# Patient Record
Sex: MALE | URBAN - METROPOLITAN AREA
[De-identification: names, ages, dates, MRNs, and addresses within clinical notes are randomized per-mention and may not be internally consistent; named-entity substitution may affect disease eponyms.]

---

## 2020-07-25 ENCOUNTER — TELEPHONE ENCOUNTER (OUTPATIENT)
Dept: URBAN - METROPOLITAN AREA CLINIC 13 | Facility: CLINIC | Age: 35
End: 2020-07-25

## 2020-07-26 ENCOUNTER — TELEPHONE ENCOUNTER (OUTPATIENT)
Dept: URBAN - METROPOLITAN AREA CLINIC 13 | Facility: CLINIC | Age: 35
End: 2020-07-26

## 2020-07-26 RX ORDER — ADALIMUMAB 40MG/0.4ML
KIT SUBCUTANEOUS
Qty: 12 | Refills: 0 | Status: ACTIVE | COMMUNITY
Start: 2019-07-31

## 2020-07-26 RX ORDER — AZATHIOPRINE 50 1/1
TABLET ORAL
Qty: 180 | Refills: 0 | Status: ACTIVE | COMMUNITY
Start: 2017-12-08

## 2020-07-26 RX ORDER — DICLOFENAC SODIUM 10 MG/G
APP 2 GRAMS AA QID GEL TOPICAL
Qty: 300 | Refills: 0 | Status: ACTIVE | COMMUNITY
Start: 2019-09-12

## 2020-07-26 RX ORDER — ADALIMUMAB 40MG/0.8ML
KIT SUBCUTANEOUS
Qty: 12 | Refills: 0 | Status: ACTIVE | COMMUNITY
Start: 2018-11-14

## 2022-04-01 ENCOUNTER — LAB OUTSIDE AN ENCOUNTER (OUTPATIENT)
Dept: URBAN - METROPOLITAN AREA CLINIC 113 | Facility: CLINIC | Age: 37
End: 2022-04-01

## 2022-04-01 ENCOUNTER — WEB ENCOUNTER (OUTPATIENT)
Dept: URBAN - METROPOLITAN AREA CLINIC 113 | Facility: CLINIC | Age: 37
End: 2022-04-01

## 2022-04-01 ENCOUNTER — OFFICE VISIT (OUTPATIENT)
Dept: URBAN - METROPOLITAN AREA CLINIC 113 | Facility: CLINIC | Age: 37
End: 2022-04-01
Payer: COMMERCIAL

## 2022-04-01 VITALS
SYSTOLIC BLOOD PRESSURE: 147 MMHG | RESPIRATION RATE: 20 BRPM | TEMPERATURE: 98.6 F | WEIGHT: 255 LBS | HEART RATE: 84 BPM | DIASTOLIC BLOOD PRESSURE: 95 MMHG | BODY MASS INDEX: 38.65 KG/M2 | HEIGHT: 68 IN

## 2022-04-01 DIAGNOSIS — K50.80 CROHN'S DISEASE OF BOTH SMALL AND LARGE INTESTINE WITHOUT COMPLICATION: ICD-10-CM

## 2022-04-01 PROCEDURE — 99204 OFFICE O/P NEW MOD 45 MIN: CPT | Performed by: INTERNAL MEDICINE

## 2022-04-01 RX ORDER — AZATHIOPRINE 50 1/1
TABLET ORAL
Qty: 180 | Refills: 0 | Status: ACTIVE | COMMUNITY
Start: 2017-12-08

## 2022-04-01 RX ORDER — ADALIMUMAB 40MG/0.4ML
KIT SUBCUTANEOUS
Qty: 12 | Refills: 0 | Status: ON HOLD | COMMUNITY
Start: 2019-07-31

## 2022-04-01 RX ORDER — DICLOFENAC SODIUM 10 MG/G
APP 2 GRAMS AA QID GEL TOPICAL
Qty: 300 | Refills: 0 | Status: ACTIVE | COMMUNITY
Start: 2019-09-12

## 2022-04-01 RX ORDER — ADALIMUMAB 40MG/0.4ML
AS DIRECTED KIT SUBCUTANEOUS
Refills: 0 | Status: ACTIVE | COMMUNITY
Start: 2018-11-14

## 2022-04-01 NOTE — HPI-TODAY'S VISIT:
Eyad Hernandes is a 36 year old male who presents as a new patient for evaluation of Crohn's disease.  He was diagnosed with ileocolonic Crohn's disease in September 2014 by Dr. Sifuentes in [place]. He waas initially treated with budesonide and Pentasa before being switched to Remicade. He did well on Remicade for ~ 9 months before flaring. His flares have been associated with long segment ileal stenosis, along with ileocolonic and colocolonic fistulization.  CRP levels at the time of his flare were 8.8; ESR was 16. He has never had a colocutaneous fistula or a fistula to any other internal organ, athough he did have an ileal abscess at one point (initial diagnosis).   He received a second opinion from Dr. Rebecca De Dios at [facility] in 2016.  He was found to have high-level antibody formation and low drug levels and was switched to Humira and azathioprine on 5/17/16.  Pretreatment TMPT was normal.  He responded well to azathioptine and Humira and was in complete endoscopic remission when he underwent staging colonoscopy in January 2017.   Notably, he has been vaccinated against Hep B (immune since 2017) and Hep A (vaccine given 9/2017) as well as pneumovax (6/2016).    He was lost to GI follow up from 12/2017-7/2019. When he returned for follow-up, he had repeat MRE testing showing stable fisutlizing disease in and around the TI and cecum. QuantiFERON gold performed in 12/2019 was negative.  He had 6 MMP levels and 6-TG levels done which showed low 6-TG levels, prompting an increase in azathioprine first to 150 mg and then to 200 mg/day.  He has been maintained on Humira 40 mg weekly.  He had a CT enterography done in April 2021 showing persistent and stable ileal stenosis, with colocolonic and ileocolonic fistulization.  His last visit at [facility] was in 9/2021.   He has been maintained on azathioprine 200 mg daily and Humira 40 mg weekly, and this regimen helps maintain his remission.  He is having 1 formed stool per day.  Typically, in the spring, he will have 1 or 2 days a year when the pollen is severe when he has no energy, abdominal pain and loose stools, and cannot eat.  Usually, he has a liquid diet for a couple of days and the symptoms resolved.  As he has now moved to [place], he has elected to follow-up here as opposed to [place].  He feels that he is doing well overall, and in fact has gained weight recently during the pandemic.  Notably, he does have a prior history of vitamin D deficiency.  His vitamin B12 levels most recently were normal.

## 2022-04-13 LAB
6-MMPN: 576
6-TGN: 309
A/G RATIO: 1.3
ALBUMIN: 4.4
ALKALINE PHOSPHATASE: 71
ALT (SGPT): 31
AST (SGOT): 22
BASO (ABSOLUTE): 0.1
BASOS: 1
BILIRUBIN, TOTAL: 0.7
BUN/CREATININE RATIO: 11
BUN: 12
C-REACTIVE PROTEIN, QUANT: 3
CALCIUM: 9
CARBON DIOXIDE, TOTAL: 19
CHLORIDE: 98
CREATININE: 1.12
EGFR: 88
EOS (ABSOLUTE): 0.1
EOS: 2
GLOBULIN, TOTAL: 3.3
GLUCOSE: 93
HEMATOCRIT: 47.2
HEMATOLOGY COMMENTS:: (no result)
HEMOGLOBIN: 16.8
IMMATURE CELLS: (no result)
IMMATURE GRANS (ABS): 0
IMMATURE GRANULOCYTES: 0
LYMPHS (ABSOLUTE): 1.1
LYMPHS: 24
MCH: 33.3
MCHC: 35.6
MCV: 94
MONOCYTES(ABSOLUTE): 0.4
MONOCYTES: 10
NEUTROPHILS (ABSOLUTE): 2.7
NEUTROPHILS: 63
NRBC: (no result)
PLATELETS: 263
POTASSIUM: 4
PROTEIN, TOTAL: 7.7
RBC: 5.05
RDW: 15
SEDIMENTATION RATE-WESTERGREN: 10
SODIUM: 136
VITAMIN B12: 519
VITAMIN D, 25-HYDROXY: 39
WBC: 4.4

## 2022-05-27 ENCOUNTER — OFFICE VISIT (OUTPATIENT)
Dept: URBAN - METROPOLITAN AREA SURGERY CENTER 25 | Facility: SURGERY CENTER | Age: 37
End: 2022-05-27
Payer: COMMERCIAL

## 2022-05-27 ENCOUNTER — CLAIMS CREATED FROM THE CLAIM WINDOW (OUTPATIENT)
Dept: URBAN - METROPOLITAN AREA CLINIC 4 | Facility: CLINIC | Age: 37
End: 2022-05-27
Payer: COMMERCIAL

## 2022-05-27 DIAGNOSIS — K50.813 CROHN'S DISEASE OF BOTH SMALL AND LARGE INTESTINE W FISTULA: ICD-10-CM

## 2022-05-27 DIAGNOSIS — K63.89 CYST OF DUODENUM: ICD-10-CM

## 2022-05-27 PROCEDURE — 45380 COLONOSCOPY AND BIOPSY: CPT | Performed by: INTERNAL MEDICINE

## 2022-05-27 PROCEDURE — 88305 TISSUE EXAM BY PATHOLOGIST: CPT | Performed by: PATHOLOGY

## 2022-05-27 PROCEDURE — G8907 PT DOC NO EVENTS ON DISCHARG: HCPCS | Performed by: INTERNAL MEDICINE

## 2022-05-27 RX ORDER — DICLOFENAC SODIUM 10 MG/G
APP 2 GRAMS AA QID GEL TOPICAL
Qty: 300 | Refills: 0 | Status: ACTIVE | COMMUNITY
Start: 2019-09-12

## 2022-05-27 RX ORDER — ADALIMUMAB 40MG/0.4ML
AS DIRECTED KIT SUBCUTANEOUS
Refills: 0 | Status: ACTIVE | COMMUNITY
Start: 2018-11-14

## 2022-05-27 RX ORDER — AZATHIOPRINE 50 1/1
TABLET ORAL
Qty: 180 | Refills: 0 | Status: ACTIVE | COMMUNITY
Start: 2017-12-08

## 2022-07-24 ENCOUNTER — WEB ENCOUNTER (OUTPATIENT)
Dept: URBAN - METROPOLITAN AREA CLINIC 113 | Facility: CLINIC | Age: 37
End: 2022-07-24

## 2022-07-24 RX ORDER — ADALIMUMAB 40MG/0.4ML
AS DIRECTED KIT SUBCUTANEOUS
Qty: 4 | Refills: 11
Start: 2018-11-14 | End: 2023-06-26

## 2022-08-01 ENCOUNTER — WEB ENCOUNTER (OUTPATIENT)
Dept: URBAN - METROPOLITAN AREA CLINIC 113 | Facility: CLINIC | Age: 37
End: 2022-08-01

## 2022-08-29 ENCOUNTER — WEB ENCOUNTER (OUTPATIENT)
Dept: URBAN - METROPOLITAN AREA CLINIC 113 | Facility: CLINIC | Age: 37
End: 2022-08-29

## 2022-08-29 ENCOUNTER — OFFICE VISIT (OUTPATIENT)
Dept: URBAN - METROPOLITAN AREA CLINIC 113 | Facility: CLINIC | Age: 37
End: 2022-08-29
Payer: COMMERCIAL

## 2022-08-29 VITALS
RESPIRATION RATE: 16 BRPM | TEMPERATURE: 98.6 F | SYSTOLIC BLOOD PRESSURE: 144 MMHG | BODY MASS INDEX: 38.95 KG/M2 | WEIGHT: 257 LBS | HEIGHT: 68 IN | HEART RATE: 67 BPM | DIASTOLIC BLOOD PRESSURE: 91 MMHG

## 2022-08-29 DIAGNOSIS — K50.80 CROHN'S DISEASE OF BOTH SMALL AND LARGE INTESTINE WITHOUT COMPLICATION: ICD-10-CM

## 2022-08-29 PROCEDURE — 99214 OFFICE O/P EST MOD 30 MIN: CPT | Performed by: INTERNAL MEDICINE

## 2022-08-29 RX ORDER — ADALIMUMAB 40MG/0.4ML
AS DIRECTED KIT SUBCUTANEOUS
Qty: 4 | Refills: 11 | Status: ACTIVE | COMMUNITY
Start: 2018-11-14 | End: 2023-06-26

## 2022-08-29 RX ORDER — DICLOFENAC SODIUM 10 MG/G
APP 2 GRAMS AA QID GEL TOPICAL
Qty: 300 | Refills: 0 | Status: ACTIVE | COMMUNITY
Start: 2019-09-12

## 2022-08-29 RX ORDER — AZATHIOPRINE 50 1/1
TABLET ORAL
Qty: 180 | Refills: 0 | Status: ACTIVE | COMMUNITY
Start: 2017-12-08

## 2022-08-29 NOTE — HPI-TODAY'S VISIT:
Eyad Hernandes is a 37 year old male who presents as for follow up of Crohn's disease.  He was last seen here on 4/1/22.  Mr. Hernandes was diagnosed with ileocolonic Crohn's disease in September 2014 by Dr. Sifuentes in [place]. He waas initially treated with budesonide and Pentasa before being switched to Remicade. He did well on Remicade for ~ 9 months before flaring. His flares have been associated with long segment ileal stenosis, along with ileocolonic and colocolonic fistulization.  CRP levels at the time of his flare were 8.8; ESR was 16. He has never had a colocutaneous fistula or a fistula to any other internal organ, athough he did have an ileal abscess at one point (initial diagnosis).   He received a second opinion from Dr. Rebecca De Dios [place] [facility] [place].  He was found to have high-level antibody formation and low drug levels and was switched to Humira and azathioprine on 5/17/16.  Pretreatment TMPT was normal.  He responded well to azathioptine and Humira and was in complete endoscopic remission when he underwent staging colonoscopy in January 2017.   Notably, he has been vaccinated against Hep B (immune since 2017) and Hep A (vaccine given 9/2017) as well as pneumovax (6/2016).    He was lost to GI follow up from 12/2017-7/2019. When he returned for follow-up, he had repeat MRE testing showing stable fistulizing disease in and around the TI and cecum. QuantiFERON gold performed in 12/2019 was negative.  He had 6 MMP levels and 6-TG levels done which showed low 6-TG levels, prompting an increase in azathioprine first to 150 mg and then to 200 mg/day.  He has been maintained on Humira 40 mg weekly.  He had a CT enterography done in April 2021 showing persistent and stable ileal stenosis, with colocolonic and ileocolonic fistulization.  His last visit at [facility] was in 9/2021.   He has been maintained on azathioprine 200 mg daily and Humira 40 mg weekly, and this regimen helps maintain his remission.  He is having 1 formed stool per day.  Typically, in the spring, he will have 1 or 2 days a year when the pollen is severe when he has no energy, abdominal pain and loose stools, and cannot eat.  Usually, he has a liquid diet for a couple of days and the symptoms resolved.  As he recently moved to [place], and then to the [facility], he elected to follow-up here as opposed to [place].  He feels that he is doing well overall, and in fact has gained weight recently during the pandemic.  Notably, he does have a prior history of vitamin D deficiency.  His vitamin B12 levels most recently were normal.  After his initial office visit, he underwent colonoscopy on 5/27/2022.  This showed some hepatic flexure pseudopolyps, fistula at the hepatic flexure, but a normal terminal ileum, left colon diverticulosis but was otherwise negative.  Random colon biopsies showed no evidence of dysplasia nor active Crohn's disease.  The patient continues to do well.  He would like to see if he can taper some of his medications.  Labs were ordered at his last visit but I have no record of them.

## 2022-10-14 ENCOUNTER — WEB ENCOUNTER (OUTPATIENT)
Dept: URBAN - METROPOLITAN AREA CLINIC 113 | Facility: CLINIC | Age: 37
End: 2022-10-14

## 2022-10-14 RX ORDER — ADALIMUMAB 40MG/0.4ML
AS DIRECTED KIT SUBCUTANEOUS
Qty: 4 | Refills: 11
Start: 2018-11-14 | End: 2023-09-15

## 2022-10-19 LAB
6 MMP: <500
6 TG: 127
A/G RATIO: 1.2
ABSOLUTE BASOPHILS: 60
ABSOLUTE EOSINOPHILS: 130
ABSOLUTE LYMPHOCYTES: 790
ABSOLUTE MONOCYTES: 380
ABSOLUTE NEUTROPHILS: 3640
ALBUMIN: 4.1
ALKALINE PHOSPHATASE: 70
ALT (SGPT): 27
AST (SGOT): 19
BASOPHILS: 1.2
BILIRUBIN, TOTAL: 0.6
BUN/CREATININE RATIO: (no result)
BUN: 14
C-REACTIVE PROTEIN, QUANT: 6.7
CALCIUM: 9
CARBON DIOXIDE, TOTAL: 26
CHLORIDE: 105
CREATININE: 1.11
EGFR: 89
EOSINOPHILS: 2.6
GLOBULIN, TOTAL: 3.3
GLUCOSE: 99
HEMATOCRIT: 48.3
HEMOGLOBIN: 16.7
LYMPHOCYTES: 15.8
MCH: 31.5
MCHC: 34.6
MCV: 91.1
MONOCYTES: 7.6
MPV: 9.8
NEUTROPHILS: 72.8
PLATELET COUNT: 238
POTASSIUM: 4.3
PROTEIN, TOTAL: 7.4
RDW: 12.9
RED BLOOD CELL COUNT: 5.3
SED RATE BY MODIFIED: 2
SODIUM: 141
VITAMIN B12: 364
VITAMIN D,25-OH,TOTAL,IA: 45
WHITE BLOOD CELL COUNT: 5

## 2022-10-21 ENCOUNTER — WEB ENCOUNTER (OUTPATIENT)
Dept: URBAN - METROPOLITAN AREA CLINIC 113 | Facility: CLINIC | Age: 37
End: 2022-10-21

## 2022-10-21 ENCOUNTER — TELEPHONE ENCOUNTER (OUTPATIENT)
Dept: URBAN - METROPOLITAN AREA CLINIC 113 | Facility: CLINIC | Age: 37
End: 2022-10-21

## 2022-10-21 RX ORDER — ADALIMUMAB 40MG/0.4ML
AS DIRECTED KIT SUBCUTANEOUS
Qty: 4 | Refills: 11
Start: 2018-11-14 | End: 2023-09-22

## 2022-10-21 RX ORDER — ADALIMUMAB 40MG/0.8ML
0.8 ML KIT SUBCUTANEOUS
Qty: 2 | Refills: 11 | OUTPATIENT
Start: 2022-10-21 | End: 2023-10-16

## 2022-10-27 ENCOUNTER — WEB ENCOUNTER (OUTPATIENT)
Dept: URBAN - METROPOLITAN AREA CLINIC 113 | Facility: CLINIC | Age: 37
End: 2022-10-27

## 2022-10-28 ENCOUNTER — WEB ENCOUNTER (OUTPATIENT)
Dept: URBAN - METROPOLITAN AREA CLINIC 113 | Facility: CLINIC | Age: 37
End: 2022-10-28

## 2022-11-17 ENCOUNTER — OFFICE VISIT (OUTPATIENT)
Dept: URBAN - METROPOLITAN AREA CLINIC 113 | Facility: CLINIC | Age: 37
End: 2022-11-17

## 2023-02-02 ENCOUNTER — OFFICE VISIT (OUTPATIENT)
Dept: URBAN - METROPOLITAN AREA CLINIC 113 | Facility: CLINIC | Age: 38
End: 2023-02-02
Payer: COMMERCIAL

## 2023-02-02 ENCOUNTER — DASHBOARD ENCOUNTERS (OUTPATIENT)
Age: 38
End: 2023-02-02

## 2023-02-02 VITALS
WEIGHT: 246.4 LBS | HEART RATE: 62 BPM | RESPIRATION RATE: 18 BRPM | TEMPERATURE: 97.1 F | HEIGHT: 68 IN | DIASTOLIC BLOOD PRESSURE: 94 MMHG | SYSTOLIC BLOOD PRESSURE: 133 MMHG | BODY MASS INDEX: 37.35 KG/M2

## 2023-02-02 DIAGNOSIS — K50.80 CROHN'S DISEASE OF BOTH SMALL AND LARGE INTESTINE WITHOUT COMPLICATION: ICD-10-CM

## 2023-02-02 PROCEDURE — 99213 OFFICE O/P EST LOW 20 MIN: CPT | Performed by: INTERNAL MEDICINE

## 2023-02-02 RX ORDER — DICLOFENAC SODIUM 10 MG/G
APP 2 GRAMS AA QID GEL TOPICAL
Qty: 300 | Refills: 0 | Status: ACTIVE | COMMUNITY
Start: 2019-09-12

## 2023-02-02 RX ORDER — AZATHIOPRINE 50 1/1
AS DIRECTED TABLET ORAL
Refills: 0 | Status: ACTIVE | COMMUNITY
Start: 2017-12-08

## 2023-02-02 RX ORDER — AZATHIOPRINE 100 MG/1
AS DIRECTED TABLET ORAL
Status: ACTIVE | COMMUNITY

## 2023-02-02 RX ORDER — ADALIMUMAB 40MG/0.4ML
AS DIRECTED KIT SUBCUTANEOUS
Qty: 4 | Refills: 11 | Status: ACTIVE | COMMUNITY
Start: 2018-11-14 | End: 2023-09-22

## 2023-02-02 RX ORDER — HYOSCYAMINE SULFATE 0.12 MG/1
1 TABLET UNDER THE TONGUE AND ALLOW TO DISSOLVE  AS NEEDED TABLET SUBLINGUAL THREE TIMES A DAY
Qty: 60 | Refills: 3 | OUTPATIENT
Start: 2023-02-02 | End: 2023-06-02

## 2023-02-02 RX ORDER — ADALIMUMAB 40MG/0.8ML
0.8 ML KIT SUBCUTANEOUS
Qty: 2 | Refills: 11 | Status: ACTIVE | COMMUNITY
Start: 2022-10-21 | End: 2023-10-16

## 2023-02-02 NOTE — HPI-TODAY'S VISIT:
Eyad Hernandes is a 37 year old male who presents as for follow up of Crohn's disease.  He was last seen here on 4/1/22.  Mr. Hernandes was diagnosed with ileocolonic Crohn's disease in September 2014 by Dr. Sifuentes in [place]. He waas initially treated with budesonide and Pentasa before being switched to Remicade. He did well on Remicade for ~ 9 months before flaring. His flares have been associated with long segment ileal stenosis, along with ileocolonic and colocolonic fistulization.  CRP levels at the time of his flare were 8.8; ESR was 16. He has never had a colocutaneous fistula or a fistula to any other internal organ, athough he did have an ileal abscess at one point (initial diagnosis).   He received a second opinion from Dr. Rebecca De Dios [place] [facility] [place].  He was found to have high-level antibody formation and low drug levels and was switched to Humira and azathioprine on 5/17/16.  Pretreatment TMPT was normal.  He responded well to azathioptine and Humira and was in complete endoscopic remission when he underwent staging colonoscopy in January 2017.   Notably, he has been vaccinated against Hep B (immune since 2017) and Hep A (vaccine given 9/2017) as well as pneumovax (6/2016).    He was lost to GI follow up from 12/2017-7/2019. When he returned for follow-up, he had repeat MRE testing showing stable fistulizing disease in and around the TI and cecum. QuantiFERON gold performed in 12/2019 was negative.  He had 6 MMP levels and 6-TG levels done which showed low 6-TG levels, prompting an increase in azathioprine first to 150 mg and then to 200 mg/day.  He has been maintained on Humira 40 mg weekly.  He had a CT enterography done in April 2021 showing persistent and stable ileal stenosis, with colocolonic and ileocolonic fistulization.  His last visit at [facility] was in 9/2021.   He has been maintained on azathioprine 200 mg daily and Humira 40 mg weekly, and this regimen helps maintain his remission.  He is having 1 formed stool per day.  Typically, in the spring, he will have 1 or 2 days a year when the pollen is severe when he has no energy, abdominal pain and loose stools, and cannot eat.  Usually, he has a liquid diet for a couple of days and the symptoms resolved.  As he recently moved to [place], and then to the [facility], he elected to follow-up here as opposed to [place].  He feels that he is doing well overall, and in fact has gained weight recently during the pandemic.  Notably, he does have a prior history of vitamin D deficiency.  His vitamin B12 levels most recently were normal.  After his initial office visit, he underwent colonoscopy on 5/27/2022.  This showed some hepatic flexure pseudopolyps, fistula at the hepatic flexure, but a normal terminal ileum, left colon diverticulosis but was otherwise negative.  Random colon biopsies showed no evidence of dysplasia nor active Crohn's disease.  The patient continues to do well.  He would like to see if he can taper some of his medications.  Labs were ordered at his last visit.  These included a white blood cell count of 5000, hemoglobin 16.7, platelet count 238,000, normal complete metabolic panel, vitamin D level of 45, C-reactive protein is 6.7, sedimentation rate of 2, and a vitamin B12 of 364.  His 6-TG level was somewhat low at 127, lower limit of normal being 235.  The patient is currently taking azathioprine 150 mg daily and weekly Humira.  Yesterday, he began having crampy lower abdominal pain and nausea with no vomiting.  He denies any diarrhea.  His wife thinks he had a subjective fever although she did not take his temperature.  He typically will have a flare of his symptoms in the spring.

## 2023-02-03 ENCOUNTER — TELEPHONE ENCOUNTER (OUTPATIENT)
Dept: URBAN - METROPOLITAN AREA CLINIC 113 | Facility: CLINIC | Age: 38
End: 2023-02-03

## 2023-02-03 PROBLEM — 71833008: Status: ACTIVE | Noted: 2022-04-01

## 2023-02-03 LAB
A/G RATIO: 1.4
ABSOLUTE BASOPHILS: 50
ABSOLUTE EOSINOPHILS: 33
ABSOLUTE LYMPHOCYTES: 772
ABSOLUTE MONOCYTES: 722
ABSOLUTE NEUTROPHILS: 6723
ALBUMIN: 4.5
ALKALINE PHOSPHATASE: 75
ALT (SGPT): 18
AST (SGOT): 16
BASOPHILS: 0.6
BILIRUBIN, TOTAL: 0.8
BUN/CREATININE RATIO: (no result)
BUN: 14
C-REACTIVE PROTEIN, QUANT: 33.9
CALCIUM: 9.3
CARBON DIOXIDE, TOTAL: 27
CHLORIDE: 102
CREATININE: 1.15
EGFR: 85
EOSINOPHILS: 0.4
GLOBULIN, TOTAL: 3.2
GLUCOSE: 114
HEMATOCRIT: 49.1
HEMOGLOBIN: 17.2
LYMPHOCYTES: 9.3
MCH: 31.3
MCHC: 35
MCV: 89.4
MONOCYTES: 8.7
MPV: 9.9
NEUTROPHILS: 81
PLATELET COUNT: 303
POTASSIUM: 4.3
PROTEIN, TOTAL: 7.7
RDW: 13.8
RED BLOOD CELL COUNT: 5.49
SED RATE BY MODIFIED: 9
SODIUM: 139
WHITE BLOOD CELL COUNT: 8.3

## 2023-02-03 RX ORDER — CIPROFLOXACIN 500 MG/1
1 TABLET TABLET, FILM COATED ORAL
Qty: 20 | OUTPATIENT
Start: 2023-02-07 | End: 2023-02-17

## 2023-12-19 ENCOUNTER — WEB ENCOUNTER (OUTPATIENT)
Dept: URBAN - METROPOLITAN AREA CLINIC 113 | Facility: CLINIC | Age: 38
End: 2023-12-19

## 2023-12-19 ENCOUNTER — ERX REFILL RESPONSE (OUTPATIENT)
Dept: URBAN - METROPOLITAN AREA CLINIC 113 | Facility: CLINIC | Age: 38
End: 2023-12-19

## 2023-12-19 RX ORDER — ADALIMUMAB 40MG/0.4ML
AS DIRECTED KIT SUBCUTANEOUS
Qty: 4 | Refills: 11
Start: 2018-11-14 | End: 2024-11-19

## 2023-12-19 RX ORDER — ADALIMUMAB 40MG/0.4ML
INJECT 40MG SUBCUTANEOUSLY  WEEKLY KIT SUBCUTANEOUS
Qty: 4 EACH | Refills: 0 | OUTPATIENT

## 2024-11-26 ENCOUNTER — WEB ENCOUNTER (OUTPATIENT)
Dept: URBAN - METROPOLITAN AREA CLINIC 113 | Facility: CLINIC | Age: 39
End: 2024-11-26

## 2025-01-22 ENCOUNTER — TELEPHONE ENCOUNTER (OUTPATIENT)
Dept: URBAN - METROPOLITAN AREA CLINIC 113 | Facility: CLINIC | Age: 40
End: 2025-01-22

## 2025-01-30 ENCOUNTER — WEB ENCOUNTER (OUTPATIENT)
Dept: URBAN - METROPOLITAN AREA CLINIC 113 | Facility: CLINIC | Age: 40
End: 2025-01-30

## 2025-01-30 RX ORDER — ADALIMUMAB-ATTO 40 MG/.8ML
AS DIRECTED INJECTION SUBCUTANEOUS WEEKLY
Refills: 3
Start: 2024-12-02 | End: 2026-01-25

## 2025-02-03 ENCOUNTER — TELEPHONE ENCOUNTER (OUTPATIENT)
Dept: URBAN - METROPOLITAN AREA CLINIC 113 | Facility: CLINIC | Age: 40
End: 2025-02-03

## 2025-02-04 ENCOUNTER — WEB ENCOUNTER (OUTPATIENT)
Dept: URBAN - METROPOLITAN AREA CLINIC 113 | Facility: CLINIC | Age: 40
End: 2025-02-04

## 2025-02-05 ENCOUNTER — TELEPHONE ENCOUNTER (OUTPATIENT)
Dept: URBAN - METROPOLITAN AREA CLINIC 113 | Facility: CLINIC | Age: 40
End: 2025-02-05

## 2025-02-05 ENCOUNTER — WEB ENCOUNTER (OUTPATIENT)
Dept: URBAN - METROPOLITAN AREA CLINIC 113 | Facility: CLINIC | Age: 40
End: 2025-02-05

## 2025-02-11 ENCOUNTER — TELEPHONE ENCOUNTER (OUTPATIENT)
Dept: URBAN - METROPOLITAN AREA CLINIC 113 | Facility: CLINIC | Age: 40
End: 2025-02-11

## 2025-02-11 ENCOUNTER — TELEPHONE ENCOUNTER (OUTPATIENT)
Dept: URBAN - METROPOLITAN AREA CLINIC 107 | Facility: CLINIC | Age: 40
End: 2025-02-11

## 2025-02-11 ENCOUNTER — WEB ENCOUNTER (OUTPATIENT)
Dept: URBAN - METROPOLITAN AREA CLINIC 113 | Facility: CLINIC | Age: 40
End: 2025-02-11

## 2025-02-11 ENCOUNTER — OFFICE VISIT (OUTPATIENT)
Dept: URBAN - METROPOLITAN AREA CLINIC 107 | Facility: CLINIC | Age: 40
End: 2025-02-11
Payer: COMMERCIAL

## 2025-02-11 ENCOUNTER — LAB OUTSIDE AN ENCOUNTER (OUTPATIENT)
Dept: URBAN - METROPOLITAN AREA CLINIC 107 | Facility: CLINIC | Age: 40
End: 2025-02-11

## 2025-02-11 ENCOUNTER — TELEPHONE ENCOUNTER (OUTPATIENT)
Dept: URBAN - METROPOLITAN AREA CLINIC 112 | Facility: CLINIC | Age: 40
End: 2025-02-11

## 2025-02-11 VITALS
DIASTOLIC BLOOD PRESSURE: 90 MMHG | SYSTOLIC BLOOD PRESSURE: 140 MMHG | HEIGHT: 68 IN | HEART RATE: 71 BPM | TEMPERATURE: 97.9 F | BODY MASS INDEX: 37.74 KG/M2 | WEIGHT: 249 LBS

## 2025-02-11 DIAGNOSIS — Z12.11 SCREENING FOR COLON CANCER: ICD-10-CM

## 2025-02-11 DIAGNOSIS — Z79.899 HIGH RISK MEDICATION USE: ICD-10-CM

## 2025-02-11 DIAGNOSIS — K50.80 CROHN'S DISEASE OF BOTH SMALL AND LARGE INTESTINE WITHOUT COMPLICATION: ICD-10-CM

## 2025-02-11 PROCEDURE — 99214 OFFICE O/P EST MOD 30 MIN: CPT | Performed by: NURSE PRACTITIONER

## 2025-02-11 RX ORDER — ADALIMUMAB-ATTO 40 MG/.8ML
AS DIRECTED INJECTION SUBCUTANEOUS WEEKLY
Refills: 3 | Status: ON HOLD | COMMUNITY
Start: 2024-12-02 | End: 2026-01-25

## 2025-02-11 RX ORDER — AZATHIOPRINE 100 MG/1
AS DIRECTED TABLET ORAL
Status: ACTIVE | COMMUNITY

## 2025-02-11 RX ORDER — ADALIMUMAB 40MG/0.4ML
INJECT 40MG SUBCUTANEOUSLY  WEEKLY KIT SUBCUTANEOUS
Qty: 4 EACH | Refills: 0

## 2025-02-11 RX ORDER — DICLOFENAC SODIUM TOPICAL GEL, 1% 10 MG/G
APP 2 GRAMS AA QID GEL TOPICAL
Qty: 300 | Refills: 0 | Status: ON HOLD | COMMUNITY
Start: 2019-09-12

## 2025-02-11 RX ORDER — AZATHIOPRINE 100 MG/1
AS DIRECTED TABLET ORAL ONCE A DAY
Qty: 90 | Refills: 3 | Status: ACTIVE | COMMUNITY
Start: 2017-12-08 | End: 2025-03-16

## 2025-02-11 RX ORDER — ADALIMUMAB 40MG/0.4ML
INJECT 40MG SUBCUTANEOUSLY  WEEKLY KIT SUBCUTANEOUS
Qty: 4 EACH | Refills: 0 | Status: ON HOLD | COMMUNITY

## 2025-02-11 RX ORDER — ADALIMUMAB-ATTO 40 MG/.8ML
AS DIRECTED INJECTION SUBCUTANEOUS WEEKLY
Refills: 3
Start: 2024-12-02 | End: 2026-02-08

## 2025-02-11 NOTE — HPI-TODAY'S VISIT:
Eyad Hernandes is a 39-year-old male who presents as for follow up of Crohn's disease.   Last seen 2/2/2023 for Crohn's multiple small and large intestine. Was started on hyoscyamine as needed for pain. Maintained in clinical remission on azathioprine 150 mg daily and Humira 40 mg subcu weekly. Labs and MR enterography ordered as he feels he is having a flare. Decreasing his azathioprine in the past resulted in subtherapeutic levels.  Mr. Hernandes was diagnosed with ileocolonic Crohn's disease in September 2014 by Dr. Sifuentes in [place]. He was initially treated with budesonide and Pentasa before being switched to Remicade. He did well on Remicade for ~ 9 months before flaring. His flares have been associated with long segment ileal stenosis, along with ileocolonic and colocolonic fistulization.  CRP levels at the time of his flare were 8.8; ESR was 16. He has never had a colocutaneous fistula or a fistula to any other internal organ, although he did have an ileal abscess at one point (initial diagnosis).   He received a second opinion from Dr. Rebecca De Dios at [facility] in 2016.  He was found to have high-level antibody formation and low drug levels and was switched to Humira and azathioprine on 5/17/16.  Pretreatment TMPT was normal.  He responded well to azathioprine and Humira and was in complete endoscopic remission when he underwent staging colonoscopy in January 2017.   Notably, he has been vaccinated against Hep B (immune since 2017) and Hep A (vaccine given 9/2017) as well as Pneumovax (6/2016).    He was lost to GI follow up from 12/2017-7/2019. When he returned for follow-up, he had repeat MRE testing showing stable fistulizing disease in and around the TI and cecum. QuantiFERON-TB gold performed in 12/2019 was negative.  He had 6 MMP levels and 6-TG levels done which showed low 6-TG levels, prompting an increase in azathioprine first to 150 mg and then to 200 mg/day.  He has been maintained on Humira 40 mg weekly.  He had a CT enterography done in April 2021 showing persistent and stable ileal stenosis, with colocolonic and ileocolonic fistulization.  His last visit at [facility] was in 9/2021.   He has been maintained on azathioprine 200 mg daily and Humira 40 mg weekly, and this regimen helps maintain his remission.  He is having 1 formed stool per day.  Typically, in the spring, he will have 1 or 2 days a year when the pollen is severe when he has no energy, abdominal pain and loose stools, and cannot eat.  Usually, he has a liquid diet for a couple of days and the symptoms resolved.  As he recently moved to [place], and then to the [facility], he elected to follow-up here as opposed to [place].  He feels that he is doing well overall, and in fact has gained weight recently during the pandemic.  Notably, he does have a prior history of vitamin D deficiency.  His vitamin B12 levels most recently were normal.  After his initial office visit, he underwent colonoscopy on 5/27/2022.  This showed some hepatic flexure pseudopolyps, fistula at the hepatic flexure, but a normal terminal ileum, left colon diverticulosis but was otherwise negative.  Random colon biopsies showed no evidence of dysplasia nor active Crohn's disease.  The patient continues to do well.  He would like to see if he can taper some of his medications.  Labs were ordered at his last visit.  These included a white blood cell count of 5000, hemoglobin 16.7, platelet count 238,000, normal complete metabolic panel, vitamin D level of 45, C-reactive protein is 6.7, sedimentation rate of 2, and a vitamin B12 of 364.  His 6-TG level was somewhat low at 127, lower limit of normal being 235.  The patient is currently taking azathioprine 150 mg daily and weekly Humira.  Yesterday, he began having crampy lower abdominal pain and nausea with no vomiting.  He denies any diarrhea.  His wife thinks he had a subjective fever, although she did not take his temperature.  He typically will have a flare of his symptoms in the spring.  Interval history, 2/11/2025 Was switched over to Amjevita, but insurance won't fill it as he was told the Humira didn't seem to be working and hasn't been seen since 2023.  Last Humira injection was 1/12/25. Currently, on Azathioprine. Has some mild RUQ pain.  1-2 BM's a day, formed, no mucous or hematochezia. He never got the MRE that was ordered at his last visit.  Had kidney stone on right went to [place] in October 2024. States he had a CT.  No GERD.  Weight is stable. He does not have a PCP or dermatologist.

## 2025-02-12 ENCOUNTER — LAB OUTSIDE AN ENCOUNTER (OUTPATIENT)
Dept: URBAN - METROPOLITAN AREA CLINIC 107 | Facility: CLINIC | Age: 40
End: 2025-02-12

## 2025-02-12 PROBLEM — 71833008: Status: ACTIVE | Noted: 2025-02-12

## 2025-02-17 LAB
A/G RATIO: 1.3
ABSOLUTE BASOPHILS: 62
ABSOLUTE EOSINOPHILS: 150
ABSOLUTE LYMPHOCYTES: 1074
ABSOLUTE MONOCYTES: 585
ABSOLUTE NEUTROPHILS: 2530
ADALIMUMAB AB, IBD: <10
ADALIMUMAB LEVEL, IBD: 2.2
ALBUMIN: 4.1
ALKALINE PHOSPHATASE: 71
ALT (SGPT): 22
AST (SGOT): 19
BASOPHILS: 1.4
BILIRUBIN, TOTAL: 0.5
BUN/CREATININE RATIO: (no result)
BUN: 13
C-REACTIVE PROTEIN, QUANT: 16.4
CALCIUM: 8.8
CARBON DIOXIDE, TOTAL: 27
CHLORIDE: 103
COMMENT: (no result)
CREATININE: 1.19
EGFR: 81
EOSINOPHILS: 3.4
GLOBULIN, TOTAL: 3.2
GLUCOSE: 115
HEMATOCRIT: 48.3
HEMOGLOBIN: 16.4
HEPATITIS B CORE AB TOTAL: (no result)
HEPATITIS B SURFACE ANTIBODY QL: (no result)
HEPATITIS B SURFACE ANTIGEN: (no result)
INTERPRETATION: (no result)
LYMPHOCYTES: 24.4
MCH: 30.3
MCHC: 34
MCV: 89.3
MITOGEN-NIL: 8.43
MONOCYTES: 13.3
MPV: 9.4
NEUTROPHILS: 57.5
PLATELET COUNT: 279
POTASSIUM: 3.7
PROTEIN, TOTAL: 7.3
QUANTIFERON NIL VALUE: 0.04
QUANTIFERON TB1 AG VALUE: 0
QUANTIFERON TB2 AG VALUE: <0
QUANTIFERON-TB GOLD PLUS: NEGATIVE
RDW: 14
RED BLOOD CELL COUNT: 5.41
SED RATE BY MODIFIED: 6
SODIUM: 139
WHITE BLOOD CELL COUNT: 4.4

## 2025-02-18 ENCOUNTER — WEB ENCOUNTER (OUTPATIENT)
Dept: URBAN - METROPOLITAN AREA CLINIC 113 | Facility: CLINIC | Age: 40
End: 2025-02-18

## 2025-02-18 ENCOUNTER — TELEPHONE ENCOUNTER (OUTPATIENT)
Dept: URBAN - METROPOLITAN AREA CLINIC 113 | Facility: CLINIC | Age: 40
End: 2025-02-18

## 2025-02-18 RX ORDER — ADALIMUMAB-ATTO 40 MG/.8ML
AS DIRECTED INJECTION SUBCUTANEOUS WEEKLY
Qty: 12 | Refills: 3
Start: 2024-12-02 | End: 2026-02-13

## 2025-02-18 RX ORDER — ADALIMUMAB-ATTO 40 MG/.4ML
AS DIRECTED INJECTION SUBCUTANEOUS WEEKLY
Qty: 12 | Refills: 3 | OUTPATIENT
Start: 2025-02-19 | End: 2026-02-14

## 2025-03-28 ENCOUNTER — OFFICE VISIT (OUTPATIENT)
Dept: URBAN - METROPOLITAN AREA CLINIC 107 | Facility: CLINIC | Age: 40
End: 2025-03-28

## 2025-04-03 ENCOUNTER — OFFICE VISIT (OUTPATIENT)
Dept: URBAN - METROPOLITAN AREA CLINIC 107 | Facility: CLINIC | Age: 40
End: 2025-04-03
Payer: COMMERCIAL

## 2025-04-03 DIAGNOSIS — Z12.11 SCREENING FOR COLON CANCER: ICD-10-CM

## 2025-04-03 DIAGNOSIS — Z79.899 HIGH RISK MEDICATION USE: ICD-10-CM

## 2025-04-03 DIAGNOSIS — K50.80 CROHN'S DISEASE OF BOTH SMALL AND LARGE INTESTINE WITHOUT COMPLICATION: ICD-10-CM

## 2025-04-03 PROCEDURE — 99214 OFFICE O/P EST MOD 30 MIN: CPT | Performed by: NURSE PRACTITIONER

## 2025-04-03 RX ORDER — ADALIMUMAB-ATTO 40 MG/.8ML
AS DIRECTED INJECTION SUBCUTANEOUS WEEKLY
Qty: 12 | Refills: 3 | Status: DISCONTINUED | COMMUNITY
Start: 2024-12-02 | End: 2026-02-13

## 2025-04-03 RX ORDER — AZATHIOPRINE 100 MG/1
AS DIRECTED TABLET ORAL
Status: ACTIVE | COMMUNITY

## 2025-04-03 RX ORDER — ADALIMUMAB-ATTO 40 MG/.4ML
AS DIRECTED INJECTION SUBCUTANEOUS WEEKLY
Qty: 12 | Refills: 3 | Status: ACTIVE | COMMUNITY
Start: 2025-02-19 | End: 2026-02-14

## 2025-04-03 RX ORDER — DICLOFENAC SODIUM TOPICAL GEL, 1% 10 MG/G
APP 2 GRAMS AA QID GEL TOPICAL
Qty: 300 | Refills: 0 | Status: DISCONTINUED | COMMUNITY
Start: 2019-09-12

## 2025-04-03 RX ORDER — ADALIMUMAB 40MG/0.4ML
INJECT 40MG SUBCUTANEOUSLY  WEEKLY KIT SUBCUTANEOUS
Qty: 4 EACH | Refills: 0 | Status: ACTIVE | COMMUNITY

## 2025-04-03 NOTE — HPI-TODAY'S VISIT:
Last seen 2/11/2025 for Crohn's.  Previously on Humira insurance switched him to Amjevita but then they would not fill as his records indicated Humira was not controlling his disease effectively.  Labs and colonoscopy ordered for further evaluation.  He was encouraged to establish PCP and dermatologist.  Interval history, 4/3/2025 Prescription for Amjevita was sent to [facility] and pharmacy.  MR enterography ordered.  [facility]  Colonoscopy is scheduled for 4/7/2025 Labs 2/17/2025.  ESR normal.  CRP elevated 16.4.  QuantiFERON-TB Gold negative.  CMP normal.  Adalimumab level  2.2 and antibody-negative.  CBC normal with Hgb 16.4.  Hepatitis B surface antigen nonreactive.  Hepatitis B core antibody total nonreactive.  Hepatitis B surface antibody nonreactive. MR enterography 2/25/2025 revealed narrowing of the terminal ileum with tethering to cecum and transverse colon but no obvious fistula or abscess.  He doing well today. Has been on Amjevita for 1.5 months. gets through [facility].  1 BM a day, more formed now.  No mucous or hematochezia. No abdominal pain.

## 2025-04-03 NOTE — HPI-TODAY'S VISIT:
Eyad Hernandes is a 39-year-old male who presents as for follow up of Crohn's disease.   Last seen 2/2/2023 for Crohn's multiple small and large intestine. Was started on hyoscyamine as needed for pain. Maintained in clinical remission on azathioprine 150 mg daily and Humira 40 mg subcu weekly. Labs and MR enterography ordered as he feels he is having a flare. Decreasing his azathioprine in the past resulted in subtherapeutic levels.  Mr. Hernandes was diagnosed with ileocolonic Crohn's disease in September 2014 by Dr. Sifuentes in [place]. He was initially treated with budesonide and Pentasa before being switched to Remicade. He did well on Remicade for ~ 9 months before flaring. His flares have been associated with long segment ileal stenosis, along with ileocolonic and colocolonic fistulization.  CRP levels at the time of his flare were 8.8; ESR was 16. He has never had a colocutaneous fistula or a fistula to any other internal organ, although he did have an ileal abscess at one point (initial diagnosis).   He received a second opinion from Dr. Rebecca De Dios at [facility] in 2016.  He was found to have high-level antibody formation and low drug levels and was switched to Humira and azathioprine on 5/17/16.  Pretreatment TMPT was normal.  He responded well to azathioprine and Humira and was in complete endoscopic remission when he underwent staging colonoscopy in January 2017.   Notably, he has been vaccinated against Hep B (immune since 2017) and Hep A (vaccine given 9/2017) as well as Pneumovax (6/2016).    He was lost to GI follow up from 12/2017-7/2019. When he returned for follow-up, he had repeat MRE testing showing stable fistulizing disease in and around the TI and cecum. QuantiFERON-TB gold performed in 12/2019 was negative.  He had 6 MMP levels and 6-TG levels done which showed low 6-TG levels, prompting an increase in azathioprine first to 150 mg and then to 200 mg/day.  He has been maintained on Humira 40 mg weekly.  He had a CT enterography done in April 2021 showing persistent and stable ileal stenosis, with colocolonic and ileocolonic fistulization.  His last visit at [facility] was in 9/2021.   He has been maintained on azathioprine 200 mg daily and Humira 40 mg weekly, and this regimen helps maintain his remission.  He is having 1 formed stool per day.  Typically, in the spring, he will have 1 or 2 days a year when the pollen is severe when he has no energy, abdominal pain and loose stools, and cannot eat.  Usually, he has a liquid diet for a couple of days and the symptoms resolved.  As he recently moved to [place], and then to the [facility], he elected to follow-up here as opposed to [place].  He feels that he is doing well overall, and in fact has gained weight recently during the pandemic.  Notably, he does have a prior history of vitamin D deficiency.  His vitamin B12 levels most recently were normal.  After his initial office visit, he underwent colonoscopy on 5/27/2022.  This showed some hepatic flexure pseudopolyps, fistula at the hepatic flexure, but a normal terminal ileum, left colon diverticulosis but was otherwise negative.  Random colon biopsies showed no evidence of dysplasia nor active Crohn's disease.  The patient continues to do well.  He would like to see if he can taper some of his medications.  Labs were ordered at his last visit.  These included a white blood cell count of 5000, hemoglobin 16.7, platelet count 238,000, normal complete metabolic panel, vitamin D level of 45, C-reactive protein is 6.7, sedimentation rate of 2, and a vitamin B12 of 364.  His 6-TG level was somewhat low at 127, lower limit of normal being 235.  The patient is currently taking azathioprine 150 mg daily and weekly Humira.  Yesterday, he began having crampy lower abdominal pain and nausea with no vomiting.  He denies any diarrhea.  His wife thinks he had a subjective fever, although she did not take his temperature.  He typically will have a flare of his symptoms in the spring.  Interval history, 2/11/2025 Was switched over to Amjevita, but insurance won't fill it as he was told the Humira didn't seem to be working and hasn't been seen since 2023.  Last Humira injection was 1/12/25. Currently, on Azathioprine. Has some mild RUQ pain.  1-2 BM's a day, formed, no mucous or hematochezia. He never got the MRE that was ordered at his last visit.  Had kidney stone on right went to [place] in October 2024. States he had a CT.  No GERD.  Weight is stable. He does not have a PCP or dermatologist.

## 2025-04-07 ENCOUNTER — OFFICE VISIT (OUTPATIENT)
Dept: URBAN - METROPOLITAN AREA MEDICAL CENTER 19 | Facility: MEDICAL CENTER | Age: 40
End: 2025-04-07
Payer: COMMERCIAL

## 2025-04-07 DIAGNOSIS — K50.813 CROHN'S DISEASE OF BOTH SMALL AND LARGE INTESTINE W FISTULA: ICD-10-CM

## 2025-04-07 DIAGNOSIS — K50.812 CROHN'S DISEASE OF BOTH SMALL AND LARGE INTESTINE WITH INTESTINAL OBSTRUCTION: ICD-10-CM

## 2025-04-07 PROCEDURE — 45380 COLONOSCOPY AND BIOPSY: CPT | Performed by: INTERNAL MEDICINE

## 2025-04-07 RX ORDER — ADALIMUMAB-ATTO 40 MG/.4ML
AS DIRECTED INJECTION SUBCUTANEOUS WEEKLY
Qty: 12 | Refills: 3 | Status: ACTIVE | COMMUNITY
Start: 2025-02-19 | End: 2026-02-14

## 2025-04-07 RX ORDER — ADALIMUMAB 40MG/0.4ML
INJECT 40MG SUBCUTANEOUSLY  WEEKLY KIT SUBCUTANEOUS
Qty: 4 EACH | Refills: 0 | Status: ACTIVE | COMMUNITY

## 2025-04-07 RX ORDER — AZATHIOPRINE 100 MG/1
AS DIRECTED TABLET ORAL
Status: ACTIVE | COMMUNITY

## 2025-04-28 ENCOUNTER — OFFICE VISIT (OUTPATIENT)
Dept: URBAN - METROPOLITAN AREA CLINIC 113 | Facility: CLINIC | Age: 40
End: 2025-04-28
Payer: COMMERCIAL

## 2025-04-28 DIAGNOSIS — Z79.899 HIGH RISK MEDICATION USE: ICD-10-CM

## 2025-04-28 DIAGNOSIS — Z12.11 SCREENING FOR COLON CANCER: ICD-10-CM

## 2025-04-28 DIAGNOSIS — K50.80 CROHN'S DISEASE OF BOTH SMALL AND LARGE INTESTINE WITHOUT COMPLICATION: ICD-10-CM

## 2025-04-28 PROCEDURE — 99214 OFFICE O/P EST MOD 30 MIN: CPT | Performed by: INTERNAL MEDICINE

## 2025-04-28 RX ORDER — ADALIMUMAB-ATTO 40 MG/.4ML
AS DIRECTED INJECTION SUBCUTANEOUS WEEKLY
Qty: 12 | Refills: 3 | Status: ACTIVE | COMMUNITY
Start: 2025-02-19 | End: 2026-02-14

## 2025-04-28 RX ORDER — ADALIMUMAB 40MG/0.4ML
INJECT 40MG SUBCUTANEOUSLY  WEEKLY KIT SUBCUTANEOUS
Qty: 4 EACH | Refills: 0 | Status: DISCONTINUED | COMMUNITY

## 2025-04-28 RX ORDER — AZATHIOPRINE 100 MG/1
AS DIRECTED TABLET ORAL
Status: ACTIVE | COMMUNITY

## 2025-04-28 NOTE — HPI-TODAY'S VISIT:
Eyad Hernandes is a 39-year-old male who presents as for follow up of Crohn's disease. He was last seen on 4/3/25 by Carmen Gagnon.  Mr. Hernandes was diagnosed with ileocolonic Crohn's disease in September 2014 by Dr. Sifuentes in [place]. He was initially treated with budesonide and Pentasa before being switched to Remicade. He did well on Remicade for ~ 9 months before flaring. His flares have been associated with long segment ileal stenosis, along with ileocolonic and colocolonic fistulization.  CRP levels at the time of his flare were 8.8; ESR was 16. He has never had a colocutaneous fistula or a fistula to any other internal organ, although he did have an ileal abscess at one point (initial diagnosis).   He received a second opinion from Dr. Rebecca De Dios at [facility] [place].  He was found to have high-level antibody formation and low drug levels and was switched to Humira and azathioprine on 5/17/16.  Pretreatment TMPT was normal.  He responded well to azathioprine and Humira and was in complete endoscopic remission when he underwent staging colonoscopy in January 2017.   Notably, he has been vaccinated against Hep B (immune since 2017) and Hep A (vaccine given 9/2017) as well as Pneumovax (6/2016).    He was lost to GI follow up from 12/2017-7/2019. When he returned for follow-up, he had repeat MRE testing showing stable fistulizing disease in and around the TI and cecum. QuantiFERON-TB gold performed in 12/2019 was negative.  He had 6 MMP levels and 6-TG levels done which showed low 6-TG levels, prompting an increase in azathioprine first to 150 mg and then to 200 mg/day.  He was maintained on Humira 40 mg weekly.  CT enterography done in April 2021 showed persistent and stable ileal stenosis, with colocolonic and ileocolonic fistulization.  His last visit at [facility] was in 9/2021.   He was seen here initially as a new patient on 4/1/22. At that time, he was taking azathioprine 200 mg daily and Humira 40 mg weekly, and this regimen helped maintain remission.  He was having 1 formed stool per day.  Typically, in the spring, he will have 1 or 2 days a year when the pollen is severe when he has no energy, abdominal pain and loose stools, and cannot eat.  Usually, he has a liquid diet for a couple of days and the symptoms resolved.  As he recently moved to [place], and then to the [facility], he elected to follow-up here as opposed to [place].  He felt that he was doing well overall, and in fact had gained weight. He did note a prior history of vitamin D deficiency. Vitamin B12 levels were consistently normal.  After his initial office visit, he underwent colonoscopy on 5/27/2022.  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  Random colon biopsies showed no evidence of dysplasia nor active Crohn's disease.  The patient continues to do well.  He would like to see if he can taper some of his medications.  Labs were ordered at his last visit.  These included a white blood cell count of 5000, hemoglobin 16.7, platelet count 238,000, normal complete metabolic panel, vitamin D level of 45, C-reactive protein is 6.7, sedimentation rate of 2, and a vitamin B12 of 364.  His 6-TG level was somewhat low at 127, lower limit of normal being 235.  The patient is currently taking azathioprine 150 mg daily and weekly Humira.  Yesterday, he began having crampy lower abdominal pain and nausea with no vomiting.  He denies any diarrhea.  His wife thinks he had a subjective fever, although she did not take his temperature.  He typically will have a flare of his symptoms in the spring.  He was seen on 2/2/2023 for Crohn's multiple small and large intestine. Was started on hyoscyamine as needed for pain. Maintained in clinical remission on azathioprine 150 mg daily and Humira 40 mg subcu weekly. Labs and MR enterography ordered as he felt he was having a flare. Decreasing his azathioprine in the past had resulted in subtherapeutic levels.  Interval history, 2/11/2025:  Was switched over to Amjevita, but insurance would not fill it initially as he was told the Humira "didn't seem to be working" and he hadn't been seen since 2023.  Last Humira injection was 1/12/25. Currently, on Azathioprine. Has some mild RUQ pain.  1-2 BM's a day, formed, no mucous or hematochezia. He never got the MRE that was ordered at his last visit.   Had kidney stone on right went to [place] in October 2024. States he had a CT.  No GERD.  Weight is stable. He does not have a PCP or dermatologist.

## 2025-04-28 NOTE — HPI-TODAY'S VISIT:
Last seen 2/11/2025 for Crohn's.  Previously on Humira insurance switched him to Amjevita but then they would not fill as his records indicated Humira was not controlling his disease effectively.  Labs and colonoscopy ordered for further evaluation.  He was encouraged to establish PCP and dermatologist.  Interval history, 4/3/2025 Prescription for Amjevita was sent to [facility] and pharmacy.  MR enterography ordered.  [facility] transferred prescription to [facility] specialty pharmacy.  Colonoscopy is scheduled for 4/7/2025  Labs 2/17/2025.  ESR normal.  CRP elevated 16.4.  QuantiFERON-TB Gold negative.  CMP normal.  Adalimumab level  2.2 and antibody-negative.  CBC normal with Hgb 16.4.  Hepatitis B surface antigen nonreactive.  Hepatitis B core antibody total nonreactive.  Hepatitis B surface antibody nonreactive.  MR enterography 2/25/2025 revealed narrowing of the terminal ileum with tethering to cecum and transverse colon but no obvious fistula or abscess.  He was doing well at the time of his 4/2/25 OV. He had been on Amjevita for 1.5 months through [facility].  1 BM a day, more formed now.  No mucous or hematochezia. No abdominal pain.  The patient underwent colonoscopy at [facility] on April 7, 2025.  This showed a normal perianal and digital rectal examination.  A 2 mm fistula was found at the hepatic flexure.  Pseudopolyps were also seen in the area of the hepatic flexure.  The terminal ileum contained a benign-appearing intrinsic severe stenosis which was unable to be traversed.  Pseudopolyps were also found in the ascending colon and scattered small diverticula found the sigmoid colon.  Grade 1 internal hemorrhoids are noted.  There is no other clinically active disease.  Random colon biopsies revealed mild inflammation without any dysplasia.  He returns today for follow-up.  He remains completely asymptomatic, and denies any abdominal pain, diarrhea, bloody stools, fever, chills, sweats, weight loss, nausea, vomiting, etc.  He is having 1 formed bowel movement per day.

## 2025-06-03 ENCOUNTER — ERX REFILL RESPONSE (OUTPATIENT)
Dept: URBAN - METROPOLITAN AREA CLINIC 113 | Facility: CLINIC | Age: 40
End: 2025-06-03

## 2025-06-03 RX ORDER — AZATHIOPRINE 100 MG/1
TAKE 1 TABLET BY MOUTH EVERY DAY AS DIRECTED TABLET ORAL
Qty: 90 TABLET | Refills: 3 | OUTPATIENT

## 2025-07-29 ENCOUNTER — WEB ENCOUNTER (OUTPATIENT)
Dept: URBAN - METROPOLITAN AREA CLINIC 113 | Facility: CLINIC | Age: 40
End: 2025-07-29

## 2025-08-21 ENCOUNTER — CLAIMS CREATED FROM THE CLAIM WINDOW (OUTPATIENT)
Dept: URBAN - METROPOLITAN AREA MEDICAL CENTER 19 | Facility: MEDICAL CENTER | Age: 40
End: 2025-08-21
Payer: COMMERCIAL

## 2025-08-21 ENCOUNTER — TELEPHONE ENCOUNTER (OUTPATIENT)
Dept: URBAN - METROPOLITAN AREA CLINIC 113 | Facility: CLINIC | Age: 40
End: 2025-08-21

## 2025-08-21 DIAGNOSIS — K63.2 FISTULA OF INTESTINE: ICD-10-CM

## 2025-08-21 DIAGNOSIS — K50.80 CROHN'S DISEASE OF BOTH SMALL AND LARGE INTESTINE WITHOUT COMPLICATIONS: ICD-10-CM

## 2025-08-21 DIAGNOSIS — A41.9 SEPSIS, UNSPECIFIED ORGANISM: ICD-10-CM

## 2025-08-21 PROCEDURE — 99254 IP/OBS CNSLTJ NEW/EST MOD 60: CPT | Performed by: INTERNAL MEDICINE

## 2025-08-22 ENCOUNTER — CLAIMS CREATED FROM THE CLAIM WINDOW (OUTPATIENT)
Dept: URBAN - METROPOLITAN AREA MEDICAL CENTER 19 | Facility: MEDICAL CENTER | Age: 40
End: 2025-08-22
Payer: COMMERCIAL

## 2025-08-22 DIAGNOSIS — K63.2 FISTULA OF INTESTINE: ICD-10-CM

## 2025-08-22 DIAGNOSIS — K50.80 CROHN'S DISEASE OF BOTH SMALL AND LARGE INTESTINE WITHOUT COMPLICATIONS: ICD-10-CM

## 2025-08-22 DIAGNOSIS — A41.9 SEPSIS, UNSPECIFIED ORGANISM: ICD-10-CM

## 2025-08-22 PROCEDURE — 99232 SBSQ HOSP IP/OBS MODERATE 35: CPT | Performed by: INTERNAL MEDICINE
